# Patient Record
(demographics unavailable — no encounter records)

---

## 2024-12-02 NOTE — HISTORY OF PRESENT ILLNESS
[FreeTextEntry1] : Jose L is a 80 y/o female being seen for consultation, hemorrhoids   s/p Laparoscopic removal of lab band, port and tubing, laparoscopic umbilical hernia repair with mesh on 10/6/21 by Dr. Blayne Vincent   Colonoscopy 4/14/21 - 5 mm cecal polyp.  Small proximal rectal polyp.  Pan-colon diverticulosis, o/w, grossly NL, splastic colon to TI.  No other colon polyps or masses.

## 2025-03-27 NOTE — REASON FOR VISIT
[Follow-Up Visit] : a follow-up visit for [Family Member] : family member [FreeTextEntry2] : pancreas cyst

## 2025-03-27 NOTE — HISTORY OF PRESENT ILLNESS
[de-identified] : This visit was provided via telehealth using real-time 2-way audio visual technology. The patient, VISHNU OKEEFE, was located at home [address] [place] [address] at the time of the visit. This provider was located at the clinic in [place], [place] at the time of the visit. The provider, patient and daughter (Sheri) participated in the telehealth encounter.  Verbal consent was given Mar 27 2025 10:15AM by the patient.   Ms. VISHNU OKEEFE is a 79 year old female who presents for a follow up visit in our pancreas cyst clinic. She is primarily Farzi speaking, daughter participated in translation.  PMH: DM2 (on Janumet) arthritis.  PSH: umbilical hernia surgery, 2 knee replacement surgery, breast reduction, cholecystectomy, uterine prolapse surgery, lap band.   CT AP 7/17/23 performed for abdominal pain with incidental findings of a 1.2 cm hypodense focus of the body/tail junction and a subcentimeter hypodense focus within the pancreatic body, both likely cystic.  Further evaluated with MRI/MRCP 8/24/23 showing Two < 1.5 cm T2 hyperintense cystic lesions in the body and tail of the pancreas which appear to communicate with the main pancreatic duct and are suggestive of side-branch IPMNs.   in retrospect, increased in size from CT of February 16, 2019. 1.1 cm left hepatic lobe cyst and a subcentimeter right hepatic lobe cyst.  MRI 2/19/24: 1.0 cm cyst in the pancreatic body which appears to communicate with the main pancreatic duct and a 1.4 cm cyst in the body tail junction, are unchanged. Additional subcentimeter cysts without significant change. No pancreatic ductal dilatation.  She presents after recent surveillance MRI 2/10/25 showing multiple pancreatic cysts are overall unchanged since 2/19/2024. A few reference cysts include: Uncinate process simple cyst measuring 0.6 cm, previously 0.6 cm. Pancreatic body measuring 1.0 cm, previously 1.0 cm. Proximal pancreatic tail measuring 1.4 cm, previously 1.4 cm. There is no main pancreatic ductal dilatation or other worrisome features.  Patient denies personal history of pancreatitis, worsening diabetes, no chronic abdominal pain, jaundice, unexplained weight loss. No family history of pancreatic cancer. Family ca hx: mother, maternal aunt- breast cancer, paternal uncle stomach gastric ca and leukemia, paternal and maternal cousin leukemia

## 2025-03-27 NOTE — ASSESSMENT
[FreeTextEntry1] : 79 year old female who presents for a follow up visit in our pancreas cyst clinic. PMH of DM2 (on Janumet).   In July 2023 had incidentally findings of pancreas cysts on CT.    She now presents after one year interval surveillance MRI 2/10/25 showing stable cysts, including: uncinate process measuring 0.6 cm, pancreatic body measuring 1.0 cm, proximal pancreatic tail measuring 1.4 cm.   There is no main pancreatic ductal dilatation or other worrisome features.   Briefly reviewed cyst pathophysiology with the patient and indication for continued surveillance of IPMNs.  Plan for repeat MRI in 18 months to assess stability and follow-up in cyst clinic after MRI.   I discussed symptoms that would raise my concern for malignant transformation including unintended weight loss, jaundice, abdominal/ back pain, and new onset or worsening diabetes. Should these develop, She should call immediately.   Patient verbalized understanding and agrees to plan.   Today, I personally spent 25 minutes in total time including reviewing imaging and studies, discussing treatment regimens, direct face to face time with the patient, patient education and counseling.

## 2025-03-27 NOTE — PHYSICAL EXAM
[Normal] : oriented to person, place and time, with appropriate affect [FreeTextEntry1] : Physical exam not performed during Telehealth visits.